# Patient Record
Sex: MALE | Race: WHITE
[De-identification: names, ages, dates, MRNs, and addresses within clinical notes are randomized per-mention and may not be internally consistent; named-entity substitution may affect disease eponyms.]

---

## 2023-10-12 ENCOUNTER — HOSPITAL ENCOUNTER (EMERGENCY)
Dept: HOSPITAL 33 - ED | Age: 73
Discharge: HOME | End: 2023-10-12
Payer: MEDICARE

## 2023-10-12 VITALS — TEMPERATURE: 97.7 F

## 2023-10-12 VITALS — HEART RATE: 80 BPM | SYSTOLIC BLOOD PRESSURE: 139 MMHG | RESPIRATION RATE: 16 BRPM | DIASTOLIC BLOOD PRESSURE: 79 MMHG

## 2023-10-12 VITALS — OXYGEN SATURATION: 95 %

## 2023-10-12 DIAGNOSIS — I95.1: Primary | ICD-10-CM

## 2023-10-12 DIAGNOSIS — N28.89: ICD-10-CM

## 2023-10-12 DIAGNOSIS — Z79.899: ICD-10-CM

## 2023-10-12 LAB
ALBUMIN SERPL-MCNC: 3.5 G/DL (ref 3.5–5)
ALP SERPL-CCNC: 53 U/L (ref 38–126)
ALT SERPL-CCNC: 18 U/L (ref 0–50)
ANION GAP SERPL CALC-SCNC: 11 MEQ/L (ref 5–15)
AST SERPL QL: 25 U/L (ref 17–59)
BASOPHILS # BLD AUTO: 0.04 X10^3/UL (ref 0–0.4)
BASOPHILS NFR BLD AUTO: 0.6 % (ref 0–0.4)
BILIRUB BLD-MCNC: 0.5 MG/DL (ref 0.2–1.3)
BUN SERPL-MCNC: 16 MG/DL (ref 9–20)
CALCIUM SPEC-MCNC: 8.5 MG/DL (ref 8.4–10.2)
CHLORIDE SERPL-SCNC: 111 MMOL/L (ref 98–107)
CO2 SERPL-SCNC: 24 MMOL/L (ref 22–30)
CREAT SERPL-MCNC: 0.84 MG/DL (ref 0.66–1.25)
EOSINOPHIL # BLD AUTO: 0.11 X10^3/UL (ref 0–0.5)
GFR SERPLBLD BASED ON 1.73 SQ M-ARVRAT: > 60 ML/MIN
GLUCOSE SERPL-MCNC: 126 MG/DL (ref 74–106)
HCT VFR BLD AUTO: 37.2 % (ref 42–50)
HGB BLD-MCNC: 12.4 G/DL (ref 12.5–18)
IMM GRANULOCYTES # BLD: 0.04 X10^3U/L (ref 0–0.03)
IMM GRANULOCYTES NFR BLD: 0.6 % (ref 0–0.4)
LYMPHOCYTES # SPEC AUTO: 1.48 X10^3/UL (ref 1–4.6)
MCH RBC QN AUTO: 32.7 PG (ref 26–32)
MCHC RBC AUTO-ENTMCNC: 33.3 G/DL (ref 32–36)
MONOCYTES # BLD AUTO: 0.51 X10^3/UL (ref 0–1.3)
NRBC # BLD AUTO: 0 X10^3U/L (ref 0–0.01)
NRBC BLD AUTO-RTO: 0 % (ref 0–0.1)
PLATELET # BLD AUTO: 170 X10^3/UL (ref 150–450)
POTASSIUM SERPLBLD-SCNC: 3.5 MMOL/L (ref 3.5–5.1)
PROT SERPL-MCNC: 5.7 G/DL (ref 6.3–8.2)
RBC # BLD AUTO: 3.79 X10^6/UL (ref 4.1–5.6)
SODIUM SERPL-SCNC: 143 MMOL/L (ref 137–145)
WBC # BLD AUTO: 6.5 X10^3/UL (ref 4–10.5)

## 2023-10-12 PROCEDURE — 71260 CT THORAX DX C+: CPT

## 2023-10-12 PROCEDURE — 96361 HYDRATE IV INFUSION ADD-ON: CPT

## 2023-10-12 PROCEDURE — 93041 RHYTHM ECG TRACING: CPT

## 2023-10-12 PROCEDURE — 93005 ELECTROCARDIOGRAM TRACING: CPT

## 2023-10-12 PROCEDURE — 83605 ASSAY OF LACTIC ACID: CPT

## 2023-10-12 PROCEDURE — 71045 X-RAY EXAM CHEST 1 VIEW: CPT

## 2023-10-12 PROCEDURE — 85379 FIBRIN DEGRADATION QUANT: CPT

## 2023-10-12 PROCEDURE — 84484 ASSAY OF TROPONIN QUANT: CPT

## 2023-10-12 PROCEDURE — 36415 COLL VENOUS BLD VENIPUNCTURE: CPT

## 2023-10-12 PROCEDURE — 85025 COMPLETE CBC W/AUTO DIFF WBC: CPT

## 2023-10-12 PROCEDURE — 96374 THER/PROPH/DIAG INJ IV PUSH: CPT

## 2023-10-12 PROCEDURE — 70450 CT HEAD/BRAIN W/O DYE: CPT

## 2023-10-12 PROCEDURE — 96360 HYDRATION IV INFUSION INIT: CPT

## 2023-10-12 PROCEDURE — 80053 COMPREHEN METABOLIC PANEL: CPT

## 2023-10-12 PROCEDURE — 99284 EMERGENCY DEPT VISIT MOD MDM: CPT

## 2023-10-12 PROCEDURE — 74177 CT ABD & PELVIS W/CONTRAST: CPT

## 2023-10-12 PROCEDURE — 36000 PLACE NEEDLE IN VEIN: CPT

## 2023-10-12 NOTE — XRAY
Indication: Syncope.



Comparison: None



Portable chest clear with a few incidental tiny calcified granuloma.  Heart

not enlarged.  Bony thorax intact with osteopenia.



Impression: Nonacute chest with incidental old granulomatous disease.

## 2023-10-12 NOTE — XRAY
Indication: Syncope.



Multiple contiguous axial images obtained through the head without contrast.



Comparison: None



Age-appropriate global atrophy and minimal periventricular degenerative

micro-ischemia bilaterally.  1.3 cm focus old infarct left periventricular

white matter.  No acute intracranial hemorrhage, abnormal extra-axial fluid

collection, or mass effect.  Fourth ventricle is midline without

hydrocephalus.  Bony calvarium intact.  Visualized paranasal sinuses and

mastoid air cells are clear.



Impression: Nonacute senile brain with small focus old left cerebral infarct.

## 2023-10-12 NOTE — ERPHSYRPT
- History of Present Illness


Time Seen by Provider: 10/12/23 14:26


Source: patient


Physician History: 


Patient is a 74-year-old white male who presents with a complaint of syncopal 

episode.  He was staining a deck and on his knees when he developed abdominal 

cramping and attempted to get to the bathroom.  He jumped up and passed out he 

was incontinent of stool.  He had had diarrhea earlier in the day as well.  On 

arrival he is alert oriented warm and dry free of any pain headache chest pain 

abdominal pain headaches etc.


Witnessed: by friend


Prior Episodes: single episode today


Timing/Duration: today


Precipitating Factors: other (Standing quickly)


Context: other (Initially he was kneeling staining a deck when he jumped to a 

standing position.)


Loss of Consciousness: brief (seconds)


Charcter of event(s): became unresponsive, incontinent urine/stool


Allergies/Adverse Reactions: 








Iodinated Contrast Media Allergy (Verified 10/12/23 16:19)


   





Home Medications: 








Allopurinol 300 mg*** [Zyloprim 300 mg***] 300 mg PO DAILY 10/12/23 [History]


Atorvastatin Calcium 40 mg PO DAILY 10/12/23 [History]


Benazepril HCl 40 mg PO DAILY 10/12/23 [History]


Metoprolol Succinate 25 mg Xl* [Toprol-Xl 25MG Tablets***] 25 mg PO DAILY 

10/12/23 [History]


Oxybutynin Chloride [Oxybutynin Chloride ER] 5 mg PO DAILY 10/12/23 [History]


Tamoxifen Citrate 20 mg PO DAILY 10/12/23 [History]








- Review of Systems


Constitutional: No Fever, No Chills


Eyes: No Symptoms


Ears, Nose, & Throat: No Symptoms


Respiratory: No Cough, No Dyspnea


Cardiac: Syncope, No Chest Pain, No Edema


Abdominal/Gastrointestinal: Diarrhea, No Abdominal Pain, No Nausea, No Vomiting


Genitourinary Symptoms: No Dysuria


Musculoskeletal: No Back Pain, No Neck Pain


Skin: No Rash


Neurological: No Dizziness, No Focal Weakness, No Sensory Changes


Psychological: No Symptoms


Endocrine: No Symptoms


All Other Systems: Reviewed and Negative





Physical Exam





- Nursing Vital Signs


Nursing Vital Signs: 


                               Initial Vital Signs











Temperature  97.7 F   10/12/23 14:11


 


Pulse Rate  76   10/12/23 14:11


 


Respiratory Rate  15   10/12/23 14:11


 


Blood Pressure  116/70   10/12/23 14:11


 


O2 Sat by Pulse Oximetry  94 L  10/12/23 14:11








                                   Pain Scale











Pain Intensity                 2

















- [place] Coma Scale


Best Eye Response ([place]): (4) open spontaneously


Best Verbal Response ([place]): (5) oriented


Best Motor Response ([place]): (6) obeys commands


[place] Total: 15





- Physical Exam


General Appearance: no apparent distress, alert


Eye Exam: bilateral eye: PERRL, EOMI


Ears, Nose, Throat Exam: normal ENT inspection, pharynx normal, moist mucous 

membranes


Neck Exam: normal inspection, non-tender, supple, full range of motion


Respiratory: normal breath sounds, lungs clear, No chest tenderness, No 

respiratory distress


Cardiovascular: regular rate/rhythm, capillary refill <2 sec, No murmur, No 

pulse deficit


Gastrointestinal: soft, No tenderness, No distention, No mass


Back Exam: normal inspection, normal range of motion, No CVA tenderness, No 

vertebral tenderness


Extremity Exam: normal inspection, normal range of motion, pelvis stable, No 

tenderness


Mental Status: alert, oriented x 3, cooperative


CNs Exam: normal speech, PERRL, No facial droop


Coordination/Gait: normal finger to nose


Motor/Sensory: no motor deficit, no sensory deficit, no pronator drift


Skin Exam: normal color, warm, dry, No rash


**SpO2 Interpretation**: normal


SpO2: 95


O2 Delivery: Room Air





- Course


Nursing assessment & vital signs reviewed: Yes


EKG Interpreted by Me: RATE (81), Sinus Rhythm, NORMAL AXIS, Non-specific ST 

Changes





- Radiology Exams


  ** Chest


X-ray Interpretation: Reviewed by me, Negative





- CT Exams


  ** Head


CT Interpretation: Other (Films reviewed by ED physician official reading is a 

old small infarct)





  ** Chest


CT Interpretation: Other





  ** Abdomen/Pelvis


CT Interpretation: Other ( nothing acute.  Reviewed by radiologist and ER 

physician report is essentially negative reviewed by ED physician official 

reading is of an exophytic left renal mass questionable malignancy)


Ordered Tests: 


                               Active Orders 24 hr











 Category Date Time Status


 


 Cardiac Monitor STAT Care  10/12/23 14:28 Active


 


 EKG-ER Only STAT Care  10/12/23 14:11 Active


 


 IV Insertion STAT Care  10/12/23 14:11 Active


 


 Orthostatic Vital Signs STAT Care  10/12/23 14:11 Active


 


 ABDOMEN AND PELVIS W CONTRAST [CT] Stat Exams  10/12/23 15:04 Completed


 


 CHEST 1 VIEW (PORTABLE) Stat Exams  10/12/23 14:12 Completed


 


 CHEST WITH CONTRAST [CT] Stat Exams  10/12/23 15:10 Completed


 


 HEAD WITHOUT CONTRAST [CT] Stat Exams  10/12/23 14:12 Completed


 


 CBC W DIFF Stat Lab  10/12/23 14:35 Completed


 


 CMP Stat Lab  10/12/23 14:35 Completed


 


 D-DIMER QUANTITATIVE Stat Lab  10/12/23 14:35 Completed


 


 Lactic Acid Stat Lab  10/12/23 14:42 Completed


 


 TROPONIN Q4H Lab  10/12/23 14:35 Completed


 


 TROPONIN Q4H Lab  10/12/23 18:30 Ordered


 


 TROPONIN Q4H Lab  10/12/23 22:30 Ordered


 


 UA W/RFX UR CULTURE Stat Lab  10/12/23 14:12 Ordered








Medication Summary











Generic Name Dose Route Start Last Admin





  Trade Name Freq  PRN Reason Stop Dose Admin


 


Sodium Chloride  1,000 mls @ 250 mls/hr  10/12/23 15:30  10/12/23 16:12





  Sodium Chloride 0.9% 1000 Ml  IV  11/11/23 15:29  250 mls/hr





  .Q4H GALLO   Administration














Discontinued Medications














Generic Name Dose Route Start Last Admin





  Trade Name Freq  PRN Reason Stop Dose Admin


 


Diphenhydramine HCl  50 mg  10/12/23 16:04  10/12/23 16:05





  Diphenhydramine Hcl 50 Mg/Ml Vial  IV  10/12/23 16:05  50 mg





  STAT ONE   Administration


 


Diphenhydramine HCl  Confirm  10/12/23 16:05 





  Diphenhydramine Hcl 50 Mg/Ml Vial  Administered  10/12/23 16:06 





  Dose  





  50 mg  





  .ROUTE  





  .STK-MED ONE  


 


Sodium Chloride  1,000 mls @ 999 mls/hr  10/12/23 14:11  10/12/23 15:35





  Sodium Chloride 0.9% 1000 Ml  IV  10/12/23 15:11  Infused





  .Q1H1M STA   Infusion


 


Sodium Chloride  Confirm  10/12/23 14:31 





  Sodium Chloride 0.9% 1000 Ml  Administered  10/12/23 14:32 





  Dose  





  1,000 mls @ ud  





  .ROUTE  





  .STK-MED ONE  











Lab/Rad Data: 


                           Laboratory Result Diagrams





                                 10/12/23 14:35 





                                 10/12/23 14:35 





                               Laboratory Results











  10/12/23 10/12/23 10/12/23 Range/Units





  14:42 14:35 14:35 


 


WBC     (4.0-10.5)  x10^3/uL


 


RBC     (4.1-5.6)  x10^6/uL


 


Hgb     (12.5-18.0)  g/dL


 


Hct     (42-50)  %


 


MCV     ()  fL


 


MCH     (26-32)  pg


 


MCHC     (32-36)  g/dL


 


RDW     (11.5-14.0)  %


 


Plt Count     (150-450)  x10^3/uL


 


MPV     (7.5-11.0)  fL


 


Gran %     (36.0-66.0)  %


 


Immature Gran % (Auto)     (0.00-0.4)  %


 


Nucleat RBC Rel Count     (0.00-0.1)  %


 


Eos # (Auto)     (0-0.5)  x10^3/uL


 


Immature Gran # (Auto)     (0.00-0.03)  x10^3u/L


 


Absolute Lymphs (auto)     (1.0-4.6)  x10^3/uL


 


Absolute Monos (auto)     (0.0-1.3)  x10^3/uL


 


Absolute Nucleated RBC     (0.00-0.01)  x10^3u/L


 


Lymphocytes %     (24.0-44.0)  %


 


Monocytes %     (0.0-12.0)  %


 


Eosinophils %     (0.00-5.0)  %


 


Basophils %     (0.0-0.4)  %


 


Absolute Granulocytes     (1.4-6.9)  x10^3/uL


 


Basophils #     (0-0.4)  x10^3/uL


 


D-Dimer    5.00 H*  (0.0-0.50)  mg/L


 


Sodium     (137-145)  mmol/L


 


Potassium     (3.5-5.1)  mmol/L


 


Chloride     ()  mmol/L


 


Carbon Dioxide     (22-30)  mmol/L


 


Anion Gap     (5-15)  MEQ/L


 


BUN     (9-20)  mg/dL


 


Creatinine     (0.66-1.25)  mg/dL


 


Estimated GFR     ML/MIN


 


Glucose     ()  mg/dL


 


Lactic Acid  1.8    (0.4-2.0)  


 


Calcium     (8.4-10.2)  mg/dL


 


Total Bilirubin     (0.2-1.3)  mg/dL


 


AST     (17-59)  U/L


 


ALT     (0-50)  U/L


 


Alkaline Phosphatase     ()  U/L


 


Troponin I   < 0.012   (0.000-0.034)  ng/mL


 


Serum Total Protein     (6.3-8.2)  g/dL


 


Albumin     (3.5-5.0)  g/dL














  10/12/23 10/12/23 Range/Units





  14:35 14:35 


 


WBC   6.5  (4.0-10.5)  x10^3/uL


 


RBC   3.79 L  (4.1-5.6)  x10^6/uL


 


Hgb   12.4 L  (12.5-18.0)  g/dL


 


Hct   37.2 L  (42-50)  %


 


MCV   98.2  ()  fL


 


MCH   32.7 H  (26-32)  pg


 


MCHC   33.3  (32-36)  g/dL


 


RDW   13.4  (11.5-14.0)  %


 


Plt Count   170  (150-450)  x10^3/uL


 


MPV   9.4  (7.5-11.0)  fL


 


Gran %   66.4 H  (36.0-66.0)  %


 


Immature Gran % (Auto)   0.6 H  (0.00-0.4)  %


 


Nucleat RBC Rel Count   0.0  (0.00-0.1)  %


 


Eos # (Auto)   0.11  (0-0.5)  x10^3/uL


 


Immature Gran # (Auto)   0.04 H  (0.00-0.03)  x10^3u/L


 


Absolute Lymphs (auto)   1.48  (1.0-4.6)  x10^3/uL


 


Absolute Monos (auto)   0.51  (0.0-1.3)  x10^3/uL


 


Absolute Nucleated RBC   0.00  (0.00-0.01)  x10^3u/L


 


Lymphocytes %   22.8 L  (24.0-44.0)  %


 


Monocytes %   7.9  (0.0-12.0)  %


 


Eosinophils %   1.7  (0.00-5.0)  %


 


Basophils %   0.6  (0.0-0.4)  %


 


Absolute Granulocytes   4.30  (1.4-6.9)  x10^3/uL


 


Basophils #   0.04  (0-0.4)  x10^3/uL


 


D-Dimer    (0.0-0.50)  mg/L


 


Sodium  143   (137-145)  mmol/L


 


Potassium  3.5   (3.5-5.1)  mmol/L


 


Chloride  111 H   ()  mmol/L


 


Carbon Dioxide  24   (22-30)  mmol/L


 


Anion Gap  11.0   (5-15)  MEQ/L


 


BUN  16   (9-20)  mg/dL


 


Creatinine  0.84   (0.66-1.25)  mg/dL


 


Estimated GFR  > 60.0   ML/MIN


 


Glucose  126 H   ()  mg/dL


 


Lactic Acid    (0.4-2.0)  


 


Calcium  8.5   (8.4-10.2)  mg/dL


 


Total Bilirubin  0.50   (0.2-1.3)  mg/dL


 


AST  25   (17-59)  U/L


 


ALT  18   (0-50)  U/L


 


Alkaline Phosphatase  53   ()  U/L


 


Troponin I    (0.000-0.034)  ng/mL


 


Serum Total Protein  5.7 L   (6.3-8.2)  g/dL


 


Albumin  3.5   (3.5-5.0)  g/dL














- Progress


Progress: improved


Progress Note: 





10/12/23 17:11


With fluid resuscitation the patient's orthostatic symptoms declined, he did 

have an elevated D-dimer we did do a CT of his head a CT of his chest with 

contrast and a CT of the abdomen pelvis most worrisome is a exophytic mass in 

the left renal area suspicious for malignancy..  Patient was informed of this 

finding and instructed to take films of his CT scans and call his family doctor 

tomorrow for urologic referral.





Medical Desision Making





- Diagnostic Testing


Diagnostic test were ordered, analyzed, and reviewed by me: Yes


Radiological Interpretation: Reviewed by me





- Risk of complications


The pt has a high risk of morbidity or mortality based on: Need for major 

surgery in patient with known risk factors





- Departure


Departure Disposition: Home


Clinical Impression: 


 Orthostatic hypotension, Left renal mass





Condition: Stable


Critical Care Time: No


Referrals: 


AZAM RENE NP [NON-STAFF PHY W/O PRIVILEGES] - Follow up/PCP as directed


Instructions:  Syncope (Fainting) (DC)

## 2023-10-12 NOTE — XRAY
Indication: Diarrhea.  Elevated d-dimer.



Multiple contiguous axial images obtained through the chest using 80 cc Isovue

370 contrast and PE protocol.



Comparison: None



Good opacification of the pulmonary arteries to include the lobar and

segmental branches.  No pulmonary embolus.  Heart not enlarged with scattered

coronary calcifications.  Aorta is normal in course and caliber with minimal

arteriosclerotic calcifications.  No pathologic mediastinal/hilar

lymphadenopathy.  Lungs demonstrates minimal bilateral dependent atelectasis.

No suspicious pulmonary mass, infiltrate, or effusion.



Bony thorax intact with osteopenia and mild degenerative changes throughout

the spine.



CT abdomen/pelvis report separately.



Impression: Negative pulmonary embolus.  No acute cardiopulmonary

abnormalities.  Chronic findings including arteriosclerotic disease and

chronic bony findings.

## 2023-10-12 NOTE — XRAY
Indication: Diarrhea.



Multiple contiguous axial images obtained through the abdomen and pelvis using

80 cc Isovue 370 contrast.



Comparison: None



CT chest reported separately.



Stomach distended with food/fluid.  Noncontrasted stomach and bowel loops

appear nonobstructed with normal appearing appendix.  Contracted gallbladder

without gallstones.  Both kidneys enhance and excrete.  Left mid kidney

demonstrates a 2.4 x 2.4 x 2.8 cm enhancing exophytic mass worrisome for

malignancy.  No free fluid/air.



Remaining liver, gallbladder, pancreas, spleen, adrenal glands, kidneys,

ureters, and bladder are unremarkable.  Mild scattered aortoiliac

calcifications.  No AAA or pathologic retroperitoneal lymphadenopathy.



Osseous structures intact with osteopenia and mild degenerative changes

throughout the spine and both hips.  Left iliopsoas muscle demonstrates

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



Impression:

1.  Suspicious enhancing left renal mass as detailed worrisome for malignancy.

2.  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

arteriosclerotic disease, and chronic bony findings.

3.  Remaining CT abdomen/pelvis without contrast exam is negative.